# Patient Record
Sex: FEMALE | Race: BLACK OR AFRICAN AMERICAN | URBAN - METROPOLITAN AREA
[De-identification: names, ages, dates, MRNs, and addresses within clinical notes are randomized per-mention and may not be internally consistent; named-entity substitution may affect disease eponyms.]

---

## 2017-08-04 ENCOUNTER — HISTORICAL (OUTPATIENT)
Dept: SURGERY | Facility: HOSPITAL | Age: 67
End: 2017-08-04

## 2017-08-07 ENCOUNTER — HISTORICAL (OUTPATIENT)
Dept: ANESTHESIOLOGY | Facility: HOSPITAL | Age: 67
End: 2017-08-07

## 2017-08-07 LAB
ABS NEUT (OLG): 6.3 X10(3)/MCL (ref 1.5–6.9)
ALBUMIN SERPL-MCNC: 3.5 GM/DL (ref 3.4–5)
ALBUMIN/GLOB SERPL: 0.9 RATIO
ALP SERPL-CCNC: 114 UNIT/L (ref 30–113)
ALT SERPL-CCNC: 31 UNIT/L (ref 10–45)
APTT PPP: 30 SECOND(S) (ref 25–35)
AST SERPL-CCNC: 20 UNIT/L (ref 15–37)
BASOPHILS # BLD AUTO: 0 X10(3)/MCL (ref 0–0.1)
BASOPHILS NFR BLD AUTO: 0 % (ref 0–1)
BILIRUB SERPL-MCNC: 0.4 MG/DL (ref 0.1–0.9)
BILIRUBIN DIRECT+TOT PNL SERPL-MCNC: 0.1 MG/DL (ref 0–0.3)
BILIRUBIN DIRECT+TOT PNL SERPL-MCNC: 0.3 MG/DL
BUN SERPL-MCNC: 15 MG/DL (ref 10–20)
CALCIUM SERPL-MCNC: 9.4 MG/DL (ref 8–10.5)
CHLORIDE SERPL-SCNC: 106 MMOL/L (ref 100–108)
CO2 SERPL-SCNC: 26 MMOL/L (ref 21–35)
CREAT SERPL-MCNC: 0.82 MG/DL (ref 0.7–1.3)
EOSINOPHIL # BLD AUTO: 0.5 X10(3)/MCL (ref 0–0.6)
EOSINOPHIL NFR BLD AUTO: 5 % (ref 0–5)
ERYTHROCYTE [DISTWIDTH] IN BLOOD BY AUTOMATED COUNT: 13 % (ref 11.5–17)
GLOBULIN SER-MCNC: 3.9 GM/DL
GLUCOSE SERPL-MCNC: 151 MG/DL (ref 75–116)
HCT VFR BLD AUTO: 43.1 % (ref 36–48)
HGB BLD-MCNC: 13.8 GM/DL (ref 12–16)
INR PPP: 1 (ref 0–1.2)
LYMPHOCYTES # BLD AUTO: 3 X10(3)/MCL (ref 0.5–4.1)
LYMPHOCYTES NFR BLD AUTO: 28 % (ref 15–40)
MCH RBC QN AUTO: 29 PG (ref 27–34)
MCHC RBC AUTO-ENTMCNC: 32 GM/DL (ref 31–36)
MCV RBC AUTO: 92 FL (ref 80–99)
MONOCYTES # BLD AUTO: 0.8 X10(3)/MCL (ref 0–1.1)
MONOCYTES NFR BLD AUTO: 7 % (ref 4–12)
NEUTROPHILS # BLD AUTO: 6.3 X10(3)/MCL (ref 1.5–6.9)
NEUTROPHILS NFR BLD AUTO: 60 % (ref 43–75)
PLATELET # BLD AUTO: 337 X10(3)/MCL (ref 140–400)
PMV BLD AUTO: 11 FL (ref 6.8–10)
POTASSIUM SERPL-SCNC: 4 MMOL/L (ref 3.6–5.2)
PROT SERPL-MCNC: 7.4 GM/DL (ref 6.4–8.2)
PROTHROMBIN TIME: 10.2 SECOND(S) (ref 9–12)
RBC # BLD AUTO: 4.71 X10(6)/MCL (ref 4.2–5.4)
SODIUM SERPL-SCNC: 141 MMOL/L (ref 135–145)
WBC # SPEC AUTO: 10.6 X10(3)/MCL (ref 4.5–11.5)

## 2022-04-30 NOTE — OP NOTE
ADMITTING DIAGNOSIS:  Difficulty swallowing with solid food with throat closing when she sleeps.    PROCEDURE:  Esophagogastroduodenoscopy for dysphagia and consideration of possible dilation.    INDICATIONS:  The patient is a 66-year-old  female with a history of trouble swallowing solid food and throat closing when she sleeps.  Patient required EGD and examination of the esophagus, stomach, and duodenum.  Duodenum was normal in all 4 portions and into the jejunum.  The pylorus was intubated without difficulty.  Antrum, fundus, and cardia of the stomach showed evidence of gastritis, primarily in the antrum, that was biopsied with cold biopsy forceps twice for histopathology.  The patient had a Z-line at 42.5 cm.  There was less than a 1 cm hiatal hernia.  The esophagus, cricopharyngeus muscle, and vocal cords were normal throughout.  No other abnormalities were seen.  Overall, the patient did very well, had no problems or difficulties, was awakened and sent to recovery in good condition.    POSTOPERATIVE DIAGNOSES:    1. Normal duodenum in all 4 portions and into the jejunum.   2. Gastritis of the antrum biopsied with cold biopsy forceps.   3. A less than 1 cm hiatal hernia with Z-line at 42.5 cm.    4. Normal esophagus, cricopharyngeus muscle, and vocal cords.    PLAN:    1. Follow up in the office to discuss results.  2. Follow up in 2 years, recheck stools for blood.        DAYSI/ERIS   DD: 08/07/2017 1115   DT: 08/07/2017 1206  Job # [id number]